# Patient Record
Sex: FEMALE | URBAN - METROPOLITAN AREA
[De-identification: names, ages, dates, MRNs, and addresses within clinical notes are randomized per-mention and may not be internally consistent; named-entity substitution may affect disease eponyms.]

---

## 2017-11-29 NOTE — PATIENT DISCUSSION
ECTROPION, ou:  VISUALLY SIGNIFICANT TO THE PATIENT. RECOMMEND ARTIFICIAL TEARS OR OR LUBRICATING OINTMENT PRN. FOLLOW.

## 2018-11-28 NOTE — PATIENT DISCUSSION
MODERATE DRY EYES: CONTINUE DISAPPEARING PRESERVATIVE OR PRESERVATIVE FREE ARTIFICIAL TEARS 4-6X A DAY, OU AND THE DAILY INTAKE OF OMEGA-3 DHA/EPA FATTY ACIDS TO HELP RELIEVE SYMPTOMS. ADD NIGHTLY LUBRICATING OINTMENT OR GEL. RETURN FOR FOLLOW-UP AS SCHEDULED OR SOONER IF SYMPTOMS WORSEN.

## 2021-11-17 NOTE — PATIENT DISCUSSION
Posterior Capsular Fibrosis Counseling: The posterior capsule is a thin, clear film that is behind the intraocular lens implant. When it becomes cloudy, it is called posterior capsular fibrosis. The diagnosis of posterior capsular fibrosis (PCF), also referred to as a secondary cataract or posterior capsular opacification (PCO), was discussed with the patient. Return for follow-up as scheduled or sooner if there is a change in vision. Breath sounds clear and equal bilaterally.

## 2022-06-04 ENCOUNTER — TELEPHONE ENCOUNTER (OUTPATIENT)
Dept: URBAN - METROPOLITAN AREA CLINIC 68 | Facility: CLINIC | Age: 67
End: 2022-06-04

## 2022-06-05 ENCOUNTER — TELEPHONE ENCOUNTER (OUTPATIENT)
Dept: URBAN - METROPOLITAN AREA CLINIC 68 | Facility: CLINIC | Age: 67
End: 2022-06-05

## 2022-06-25 ENCOUNTER — TELEPHONE ENCOUNTER (OUTPATIENT)
Age: 67
End: 2022-06-25

## 2022-06-26 ENCOUNTER — TELEPHONE ENCOUNTER (OUTPATIENT)
Age: 67
End: 2022-06-26

## 2022-11-04 ENCOUNTER — ESTABLISHED PATIENT (OUTPATIENT)
Dept: URBAN - METROPOLITAN AREA CLINIC 20 | Facility: CLINIC | Age: 67
End: 2022-11-04

## 2022-11-04 DIAGNOSIS — H52.4: ICD-10-CM

## 2022-11-04 DIAGNOSIS — D31.31: ICD-10-CM

## 2022-11-04 DIAGNOSIS — H04.123: ICD-10-CM

## 2022-11-04 DIAGNOSIS — H25.813: ICD-10-CM

## 2022-11-04 PROCEDURE — 92014 COMPRE OPH EXAM EST PT 1/>: CPT

## 2022-11-04 PROCEDURE — 92015 DETERMINE REFRACTIVE STATE: CPT

## 2022-11-04 ASSESSMENT — KERATOMETRY
OS_K1POWER_DIOPTERS: 46.25
OD_AXISANGLE_DEGREES: 137
OD_K1POWER_DIOPTERS: 45.75
OD_AXISANGLE2_DEGREES: 47
OS_AXISANGLE2_DEGREES: 90
OD_K2POWER_DIOPTERS: 46.00
OS_AXISANGLE_DEGREES: 0
OS_K2POWER_DIOPTERS: 46.25

## 2022-11-04 ASSESSMENT — TONOMETRY
OD_IOP_MMHG: 17
OS_IOP_MMHG: 16

## 2022-11-04 ASSESSMENT — VISUAL ACUITY
OS_CC: 20/20-1
OD_CC: 20/25+2
OU_CC: J1+

## 2024-01-18 ENCOUNTER — ESTABLISHED PATIENT (OUTPATIENT)
Dept: URBAN - METROPOLITAN AREA CLINIC 20 | Facility: CLINIC | Age: 69
End: 2024-01-18

## 2024-01-18 DIAGNOSIS — H52.4: ICD-10-CM

## 2024-01-18 DIAGNOSIS — H25.813: ICD-10-CM

## 2024-01-18 DIAGNOSIS — H34.8320: ICD-10-CM

## 2024-01-18 DIAGNOSIS — H04.123: ICD-10-CM

## 2024-01-18 PROCEDURE — 92014 COMPRE OPH EXAM EST PT 1/>: CPT

## 2024-01-18 PROCEDURE — 92134 CPTRZ OPH DX IMG PST SGM RTA: CPT

## 2024-01-18 PROCEDURE — 92015 DETERMINE REFRACTIVE STATE: CPT

## 2024-01-18 ASSESSMENT — TONOMETRY
OS_IOP_MMHG: 16
OD_IOP_MMHG: 15

## 2024-01-18 ASSESSMENT — KERATOMETRY
OS_AXISANGLE2_DEGREES: 90
OD_AXISANGLE_DEGREES: 137
OD_AXISANGLE2_DEGREES: 47
OS_K1POWER_DIOPTERS: 46.25
OS_AXISANGLE_DEGREES: 0
OD_K2POWER_DIOPTERS: 46.00
OS_K2POWER_DIOPTERS: 46.25
OD_K1POWER_DIOPTERS: 45.75

## 2024-01-18 ASSESSMENT — VISUAL ACUITY
OU_CC: J1
OD_CC: 20/30-1
OS_CC: 20/30+1

## 2024-02-07 ENCOUNTER — ESTABLISHED PATIENT (OUTPATIENT)
Dept: URBAN - METROPOLITAN AREA CLINIC 20 | Facility: CLINIC | Age: 69
End: 2024-02-07

## 2024-02-07 DIAGNOSIS — H34.8320: ICD-10-CM

## 2024-02-07 DIAGNOSIS — H35.62: ICD-10-CM

## 2024-02-07 DIAGNOSIS — H35.371: ICD-10-CM

## 2024-02-07 DIAGNOSIS — H43.813: ICD-10-CM

## 2024-02-07 PROCEDURE — 67028 INJECTION EYE DRUG: CPT

## 2024-02-07 PROCEDURE — 99214 OFFICE O/P EST MOD 30 MIN: CPT

## 2024-02-07 PROCEDURE — 92134 CPTRZ OPH DX IMG PST SGM RTA: CPT

## 2024-02-07 PROCEDURE — 92201 OPSCPY EXTND RTA DRAW UNI/BI: CPT

## 2024-02-07 ASSESSMENT — TONOMETRY
OD_IOP_MMHG: 14
OS_IOP_MMHG: 18

## 2024-02-07 ASSESSMENT — VISUAL ACUITY
OD_CC: 20/25-2
OS_CC: 20/20-2

## 2024-03-06 ENCOUNTER — CLINIC PROCEDURE ONLY (OUTPATIENT)
Dept: URBAN - METROPOLITAN AREA CLINIC 20 | Facility: CLINIC | Age: 69
End: 2024-03-06

## 2024-03-06 DIAGNOSIS — H34.8320: ICD-10-CM

## 2024-03-06 DIAGNOSIS — H35.371: ICD-10-CM

## 2024-03-06 PROCEDURE — 67028 INJECTION EYE DRUG: CPT

## 2024-03-06 PROCEDURE — 92134 CPTRZ OPH DX IMG PST SGM RTA: CPT

## 2024-03-06 ASSESSMENT — TONOMETRY
OD_IOP_MMHG: 12
OS_IOP_MMHG: 11

## 2024-03-06 ASSESSMENT — VISUAL ACUITY
OS_CC: 20/25+2
OD_CC: 20/30-2

## 2024-05-01 ENCOUNTER — CLINIC PROCEDURE ONLY (OUTPATIENT)
Dept: URBAN - METROPOLITAN AREA CLINIC 20 | Facility: CLINIC | Age: 69
End: 2024-05-01

## 2024-05-01 DIAGNOSIS — H34.8320: ICD-10-CM

## 2024-05-01 PROCEDURE — 67028 INJECTION EYE DRUG: CPT

## 2024-05-01 ASSESSMENT — TONOMETRY
OS_IOP_MMHG: 15
OD_IOP_MMHG: 11

## 2024-05-01 ASSESSMENT — VISUAL ACUITY
OS_CC: 20/25-2
OU_CC: 20/20-1
OD_CC: 20/40

## 2024-07-10 ENCOUNTER — FOLLOW UP (OUTPATIENT)
Dept: URBAN - METROPOLITAN AREA CLINIC 20 | Facility: CLINIC | Age: 69
End: 2024-07-10

## 2024-07-10 DIAGNOSIS — H34.8320: ICD-10-CM

## 2024-07-10 DIAGNOSIS — H35.371: ICD-10-CM

## 2024-07-10 DIAGNOSIS — H43.813: ICD-10-CM

## 2024-07-10 DIAGNOSIS — H35.62: ICD-10-CM

## 2024-07-10 PROCEDURE — 92134 CPTRZ OPH DX IMG PST SGM RTA: CPT

## 2024-07-10 PROCEDURE — 99213 OFFICE O/P EST LOW 20 MIN: CPT

## 2024-07-10 ASSESSMENT — TONOMETRY
OD_IOP_MMHG: 13
OS_IOP_MMHG: 11

## 2024-07-10 ASSESSMENT — VISUAL ACUITY
OD_CC: 20/30
OS_CC: 20/25